# Patient Record
Sex: FEMALE | Race: WHITE | NOT HISPANIC OR LATINO | URBAN - METROPOLITAN AREA
[De-identification: names, ages, dates, MRNs, and addresses within clinical notes are randomized per-mention and may not be internally consistent; named-entity substitution may affect disease eponyms.]

---

## 2020-10-21 ENCOUNTER — APPOINTMENT (RX ONLY)
Dept: URBAN - METROPOLITAN AREA CLINIC 167 | Facility: CLINIC | Age: 30
Setting detail: DERMATOLOGY
End: 2020-10-21

## 2020-10-21 DIAGNOSIS — L21.8 OTHER SEBORRHEIC DERMATITIS: ICD-10-CM

## 2020-10-21 DIAGNOSIS — L57.8 OTHER SKIN CHANGES DUE TO CHRONIC EXPOSURE TO NONIONIZING RADIATION: ICD-10-CM

## 2020-10-21 DIAGNOSIS — I78.8 OTHER DISEASES OF CAPILLARIES: ICD-10-CM

## 2020-10-21 DIAGNOSIS — Z71.89 OTHER SPECIFIED COUNSELING: ICD-10-CM

## 2020-10-21 DIAGNOSIS — D22 MELANOCYTIC NEVI: ICD-10-CM

## 2020-10-21 PROBLEM — D22.61 MELANOCYTIC NEVI OF RIGHT UPPER LIMB, INCLUDING SHOULDER: Status: ACTIVE | Noted: 2020-10-21

## 2020-10-21 PROBLEM — D22.62 MELANOCYTIC NEVI OF LEFT UPPER LIMB, INCLUDING SHOULDER: Status: ACTIVE | Noted: 2020-10-21

## 2020-10-21 PROBLEM — D48.5 NEOPLASM OF UNCERTAIN BEHAVIOR OF SKIN: Status: ACTIVE | Noted: 2020-10-21

## 2020-10-21 PROBLEM — D22.5 MELANOCYTIC NEVI OF TRUNK: Status: ACTIVE | Noted: 2020-10-21

## 2020-10-21 PROCEDURE — 99203 OFFICE O/P NEW LOW 30 MIN: CPT | Mod: 25

## 2020-10-21 PROCEDURE — ? COUNSELING

## 2020-10-21 PROCEDURE — ? BIOPSY BY SHAVE METHOD

## 2020-10-21 PROCEDURE — 11102 TANGNTL BX SKIN SINGLE LES: CPT

## 2020-10-21 PROCEDURE — ? OTHER

## 2020-10-21 PROCEDURE — ? PRESCRIPTION

## 2020-10-21 PROCEDURE — ? TREATMENT REGIMEN

## 2020-10-21 PROCEDURE — 11103 TANGNTL BX SKIN EA SEP/ADDL: CPT

## 2020-10-21 PROCEDURE — ? EDUCATIONAL RESOURCES PROVIDED

## 2020-10-21 RX ORDER — CLOBETASOL PROPIONATE 0.5 MG/ML
SOLUTION TOPICAL
Qty: 1 | Refills: 5 | Status: ERX | COMMUNITY
Start: 2020-10-21

## 2020-10-21 RX ADMIN — CLOBETASOL PROPIONATE 1: 0.5 SOLUTION TOPICAL at 00:00

## 2020-10-21 ASSESSMENT — LOCATION DETAILED DESCRIPTION DERM
LOCATION DETAILED: RIGHT SUPERIOR MEDIAL UPPER BACK
LOCATION DETAILED: LEFT LATERAL DISTAL UPPER ARM
LOCATION DETAILED: LEFT RIB CAGE
LOCATION DETAILED: LEFT ANTERIOR PROXIMAL UPPER ARM
LOCATION DETAILED: LEFT INFERIOR CENTRAL MALAR CHEEK
LOCATION DETAILED: LEFT CENTRAL PARIETAL SCALP
LOCATION DETAILED: LEFT CLAVICULAR NECK
LOCATION DETAILED: RIGHT INFERIOR CENTRAL MALAR CHEEK
LOCATION DETAILED: RIGHT CENTRAL MALAR CHEEK
LOCATION DETAILED: RIGHT ANTERIOR PROXIMAL UPPER ARM

## 2020-10-21 ASSESSMENT — LOCATION SIMPLE DESCRIPTION DERM
LOCATION SIMPLE: RIGHT UPPER BACK
LOCATION SIMPLE: SCALP
LOCATION SIMPLE: LEFT UPPER ARM
LOCATION SIMPLE: ABDOMEN
LOCATION SIMPLE: LEFT CHEEK
LOCATION SIMPLE: RIGHT CHEEK
LOCATION SIMPLE: LEFT ANTERIOR NECK
LOCATION SIMPLE: RIGHT UPPER ARM

## 2020-10-21 ASSESSMENT — LOCATION ZONE DERM
LOCATION ZONE: FACE
LOCATION ZONE: NECK
LOCATION ZONE: ARM
LOCATION ZONE: TRUNK
LOCATION ZONE: SCALP

## 2020-10-21 NOTE — HPI: SKIN LESION
What Type Of Note Output Would You Prefer (Optional)?: Bullet Format
How Severe Is Your Skin Lesion?: mild
Has Your Skin Lesion Been Treated?: been treated
Is This A New Presentation, Or A Follow-Up?: Mole
When Was It Treated?: 2010

## 2020-10-21 NOTE — PROCEDURE: OTHER
Note Text (......Xxx Chief Complaint.): This diagnosis correlates with the
Other (Free Text): Raised spots on bilateral cheeks x 2 years\\n\\nDiscussed laser treatment-patient declined
Detail Level: Detailed
Other (Free Text): Itchy mole, changing in shape on left arm
Other (Free Text): Itchy mole on left neck x many years
Other (Free Text): Itchy patch on scalp x 10 months\\nUsing triamcinolone cream every other day since May or June 2020\\nmakes her hair greasy\\nhelps, but not going away\\nwas told to use a dandruff shampoo, head and shoulders, but it did not help
Other (Free Text): Reoccurring mole under left breast (was removed 10 years ago)\\nbx was benign\\nraised, but not as big as it was\\nis a recurrent nevus
Other (Free Text): Has questions regarding skin care products\\nusing lotion BID only\\n \\nWears SPF 50 outside, only when out a long time\\nWears SPF 30 on other days\\n\\nrecc Differin (adapalene)\\nrecc Alto serum - antioxidant\\nrecc Elta MD or La Roche Posay

## 2020-10-21 NOTE — PROCEDURE: BIOPSY BY SHAVE METHOD
Detail Level: Detailed
Depth Of Biopsy: dermis
Was A Bandage Applied: Yes
Size Of Lesion In Cm: 0.2
X Size Of Lesion In Cm: 0
Biopsy Type: H and E
Biopsy Method: Personna blade
Anesthesia Type: 1% lidocaine with epinephrine
Anesthesia Volume In Cc (Will Not Render If 0): 0.5
Hemostasis: Drysol
Wound Care: Aquaphor
Dressing: bandage
Destruction After The Procedure: No
Type Of Destruction Used: Curettage
Curettage Text: The wound bed was treated with curettage after the biopsy was performed.
Cryotherapy Text: The wound bed was treated with cryotherapy after the biopsy was performed.
Electrodesiccation Text: The wound bed was treated with electrodesiccation after the biopsy was performed.
Electrodesiccation And Curettage Text: The wound bed was treated with electrodesiccation and curettage after the biopsy was performed.
Silver Nitrate Text: The wound bed was treated with silver nitrate after the biopsy was performed.
Lab: [id number]
Lab Facility: [id number]
Consent: Written consent was obtained and risks were reviewed including but not limited to scarring, infection, bleeding, scabbing, incomplete removal, nerve damage and allergy to anesthesia.
Post-Care Instructions: I reviewed with the patient in detail post-care instructions. Patient is to keep the area dry and covered with the bandage for about 24 hours.  Once the bandage is removed, the patient should wash the site with soap and water, apply Aquaphor or Vasiline and re-cover the site with a bandage once to twice daily until healed.  Keeping a thin layer of ointment on the wound (rather than letting a scab form) helps promote healing. Antibiotic ointments (Neosporin, Polysporin, Bacitracin, Triple Antibiotic) are not needed nor recommended, but may be used if they have been used more than twice in the past without developing an allergic reaction to them. Should the patient develop any fevers, chills, bleeding, severe pain, increased redness/drainage/crust/pus, patient will contact the office immediately.
Notification Instructions: Patient will be notified of biopsy results. However, patient instructed to call the office if not contacted within 2 weeks.
Billing Type: Third-Party Bill
Information: Selecting Yes will display possible errors in your note based on the variables you have selected. This validation is only offered as a suggestion for you. PLEASE NOTE THAT THE VALIDATION TEXT WILL BE REMOVED WHEN YOU FINALIZE YOUR NOTE. IF YOU WANT TO FAX A PRELIMINARY NOTE YOU WILL NEED TO TOGGLE THIS TO 'NO' IF YOU DO NOT WANT IT IN YOUR FAXED NOTE.
Size Of Lesion In Cm: 0.3
Lab: [id number]
Lab Facility: [id number]
Post-Care Instructions: I reviewed with the patient in detail post-care instructions. Patient is to keep the area dry and covered with the bandage for about 24 hours.  Once the bandage is removed, the patient should wash the site with soap and water, apply Aquaphor or Vasiline and re-cover the site with a bandage once to twice daily until healed.  Keeping a thin layer of ointment on the wound (rather than letting a scab form) helps promote healing. Antibiotic ointments (Neosporin, Polysporin, Bacitracin, Triple Antibiotic) are not needed nor recommended, but may be used if they have been used more than twice in the past without developing an allergic reaction to them. Should the patient develop any fevers, chills, bleeding, severe pain, increased redness/drainage/crust/pus, patient will contact the office immediately.
Billing Type: Third-Party Bill

## 2020-10-21 NOTE — PROCEDURE: TREATMENT REGIMEN
Otc Regimen: Recommended:\\nElta MD sunscreen and/or LaRoche Pose sunscreen\\nTopical Antioxidants (skin better alto serum)\\nTopical retinoid (Differin)
Detail Level: Zone
Action 4: Continue
Start Regimen: Clobetasol solution twice daily

## 2021-10-19 ENCOUNTER — APPOINTMENT (RX ONLY)
Dept: URBAN - METROPOLITAN AREA CLINIC 166 | Facility: CLINIC | Age: 31
Setting detail: DERMATOLOGY
End: 2021-10-19

## 2021-10-19 DIAGNOSIS — L90.5 SCAR CONDITIONS AND FIBROSIS OF SKIN: ICD-10-CM

## 2021-10-19 DIAGNOSIS — L71.0 PERIORAL DERMATITIS: ICD-10-CM

## 2021-10-19 DIAGNOSIS — L21.8 OTHER SEBORRHEIC DERMATITIS: ICD-10-CM

## 2021-10-19 PROCEDURE — 99214 OFFICE O/P EST MOD 30 MIN: CPT

## 2021-10-19 PROCEDURE — ? PRESCRIPTION

## 2021-10-19 PROCEDURE — ? PHOTO-DOCUMENTATION

## 2021-10-19 PROCEDURE — ? OTHER

## 2021-10-19 PROCEDURE — ? COUNSELING

## 2021-10-19 PROCEDURE — ? TREATMENT REGIMEN

## 2021-10-19 RX ORDER — PIMECROLIMUS 10 MG/G
CREAM TOPICAL
Qty: 60 | Refills: 3 | Status: ERX | COMMUNITY
Start: 2021-10-19

## 2021-10-19 RX ORDER — MINOCYCLINE 15 MG/G
AEROSOL, FOAM TOPICAL
Qty: 30 | Refills: 5 | Status: ERX | COMMUNITY
Start: 2021-10-19

## 2021-10-19 RX ORDER — IVERMECTIN 10 MG/G
CREAM TOPICAL
Qty: 45 | Refills: 5 | Status: ERX | COMMUNITY
Start: 2021-10-19

## 2021-10-19 RX ADMIN — IVERMECTIN: 10 CREAM TOPICAL at 00:00

## 2021-10-19 RX ADMIN — MINOCYCLINE: 15 AEROSOL, FOAM TOPICAL at 00:00

## 2021-10-19 RX ADMIN — PIMECROLIMUS 1: 10 CREAM TOPICAL at 00:00

## 2021-10-19 ASSESSMENT — LOCATION DETAILED DESCRIPTION DERM
LOCATION DETAILED: LEFT INFERIOR FRONTAL SCALP
LOCATION DETAILED: LEFT UPPER CUTANEOUS LIP
LOCATION DETAILED: RIGHT UPPER CUTANEOUS LIP
LOCATION DETAILED: LEFT LATERAL DISTAL UPPER ARM

## 2021-10-19 ASSESSMENT — LOCATION SIMPLE DESCRIPTION DERM
LOCATION SIMPLE: RIGHT LIP
LOCATION SIMPLE: SCALP
LOCATION SIMPLE: LEFT LIP
LOCATION SIMPLE: LEFT UPPER ARM

## 2021-10-19 ASSESSMENT — LOCATION ZONE DERM
LOCATION ZONE: LIP
LOCATION ZONE: SCALP
LOCATION ZONE: ARM

## 2021-10-19 NOTE — PROCEDURE: TREATMENT REGIMEN
Detail Level: Zone
Samples Given: Neutrogena Tsal and Tgel Therapeutic Shampoo
Otc Regimen: Neutrogena Tsal and Tgel Therapeutic Shampoo twice a week as needed.
Otc Regimen: Differen every night
Samples Given: Soolantra cream\\nZilxi Foam\\n.

## 2021-10-19 NOTE — PROCEDURE: OTHER
Detail Level: Detailed
Other (Free Text): 10/21/20\\nItchy patch on scalp x 10 months\\nUsing triamcinolone cream every other day since May or June 2020\\nmakes her hair greasy\\nhelps, but not going away\\nwas told to use a dandruff shampoo, head and shoulders, but it did not help\\n\\n10/19/21\\nstill using clobetasol solution \\nuses it for about a week when flared up \\nthen stops using it \\nWhen she stops, it flares again about a week later\\nTried different shampoos (normal shampoo, head and shoulders, and another shampoo)\\n\\nOn scalp above left ear\\nSleeps on left side\\nIs just thin adherent scale right now, no inflammation, no crust/scabs - chronic, not stable\\n\\nTry T-sal or T-gel to decrease scale\\nwill refill clobetasol
Note Text (......Xxx Chief Complaint.): This diagnosis correlates with the
Render Risk Assessment In Note?: no
Other (Free Text): Rash under nose\\n**rash, itchy,red,was painful and flaky \\n**started at the end of July through beginning of September (has pictures) - there are red scaly plaques around the nose and a few red papules along the NLF\\nStarted using TAC 0.1% cr at the beginning of September \\nwas using it twice a day for 1 week, then cleared up\\nBut it recurred\\nUses TAC prn\\nDoesn’t go away completely\\n\\nstarted using new tinted moisturizer in June (still using it)\\nStarted using a nasal spray before the rash\\nHasn’t used it for month\\n\\nJust developed new red bumps on the L chin\\nStop TAC - may cause a rebound flare \\nStart Elidel BID\\nTry samples of Zilxi and Soolantra\\nCan fill the one(s) that she likes - Rx's sent\\nHandout of POD\\nRefer to [email or web address] \"diet and rosacea\"
Render Risk Assessment In Note?: yes
Other (Free Text): biopsy on L upper arm 10/21/20\\nLentigonous Compound Melanocytic Nevus\\n**flat brown scar\\nwants to know if there’s any tx for it\\nIs hyperpigmented\\ngets darker in the sun\\nwears sunscreen\\nTried OTC Mederma - created a white spot around it\\n\\nTry OTC Differin Qhs\\nSun protection\\nCan consider Lytera 2.0\\nProsil/silicone probably won't help\\n

## 2022-01-13 ENCOUNTER — APPOINTMENT (RX ONLY)
Dept: URBAN - METROPOLITAN AREA CLINIC 166 | Facility: CLINIC | Age: 32
Setting detail: DERMATOLOGY
End: 2022-01-13

## 2022-01-13 DIAGNOSIS — Z71.89 OTHER SPECIFIED COUNSELING: ICD-10-CM

## 2022-01-13 DIAGNOSIS — D22 MELANOCYTIC NEVI: ICD-10-CM

## 2022-01-13 DIAGNOSIS — L21.8 OTHER SEBORRHEIC DERMATITIS: ICD-10-CM

## 2022-01-13 DIAGNOSIS — L71.0 PERIORAL DERMATITIS: ICD-10-CM

## 2022-01-13 DIAGNOSIS — L81.4 OTHER MELANIN HYPERPIGMENTATION: ICD-10-CM

## 2022-01-13 PROBLEM — D22.71 MELANOCYTIC NEVI OF RIGHT LOWER LIMB, INCLUDING HIP: Status: ACTIVE | Noted: 2022-01-13

## 2022-01-13 PROBLEM — D22.5 MELANOCYTIC NEVI OF TRUNK: Status: ACTIVE | Noted: 2022-01-13

## 2022-01-13 PROBLEM — D22.61 MELANOCYTIC NEVI OF RIGHT UPPER LIMB, INCLUDING SHOULDER: Status: ACTIVE | Noted: 2022-01-13

## 2022-01-13 PROBLEM — D22.62 MELANOCYTIC NEVI OF LEFT UPPER LIMB, INCLUDING SHOULDER: Status: ACTIVE | Noted: 2022-01-13

## 2022-01-13 PROBLEM — D22.72 MELANOCYTIC NEVI OF LEFT LOWER LIMB, INCLUDING HIP: Status: ACTIVE | Noted: 2022-01-13

## 2022-01-13 PROCEDURE — ? PRESCRIPTION

## 2022-01-13 PROCEDURE — ? TREATMENT REGIMEN

## 2022-01-13 PROCEDURE — 99214 OFFICE O/P EST MOD 30 MIN: CPT

## 2022-01-13 PROCEDURE — ? PHOTO-DOCUMENTATION

## 2022-01-13 PROCEDURE — ? COUNSELING

## 2022-01-13 PROCEDURE — ? OTHER

## 2022-01-13 RX ORDER — CLOBETASOL PROPIONATE 0.5 MG/ML
SOLUTION TOPICAL
Qty: 50 | Refills: 3 | Status: ERX

## 2022-01-13 RX ORDER — AZELAIC ACID 0.15 G/G
GEL TOPICAL
Qty: 50 | Refills: 6 | Status: ERX | COMMUNITY
Start: 2022-01-13

## 2022-01-13 RX ADMIN — AZELAIC ACID 1: 0.15 GEL TOPICAL at 00:00

## 2022-01-13 ASSESSMENT — LOCATION SIMPLE DESCRIPTION DERM
LOCATION SIMPLE: RIGHT POSTERIOR UPPER ARM
LOCATION SIMPLE: RIGHT LIP
LOCATION SIMPLE: LEFT POSTERIOR THIGH
LOCATION SIMPLE: RIGHT POSTERIOR THIGH
LOCATION SIMPLE: UPPER BACK
LOCATION SIMPLE: LEFT POSTERIOR UPPER ARM
LOCATION SIMPLE: LEFT UPPER BACK
LOCATION SIMPLE: SCALP
LOCATION SIMPLE: LEFT LIP

## 2022-01-13 ASSESSMENT — LOCATION DETAILED DESCRIPTION DERM
LOCATION DETAILED: RIGHT PROXIMAL POSTERIOR UPPER ARM
LOCATION DETAILED: LEFT SUPERIOR MEDIAL UPPER BACK
LOCATION DETAILED: RIGHT UPPER CUTANEOUS LIP
LOCATION DETAILED: LEFT INFERIOR FRONTAL SCALP
LOCATION DETAILED: LEFT DISTAL POSTERIOR UPPER ARM
LOCATION DETAILED: LEFT PROXIMAL POSTERIOR UPPER ARM
LOCATION DETAILED: INFERIOR THORACIC SPINE
LOCATION DETAILED: RIGHT DISTAL POSTERIOR THIGH
LOCATION DETAILED: LEFT UPPER CUTANEOUS LIP
LOCATION DETAILED: LEFT DISTAL POSTERIOR THIGH

## 2022-01-13 ASSESSMENT — LOCATION ZONE DERM
LOCATION ZONE: TRUNK
LOCATION ZONE: LIP
LOCATION ZONE: ARM
LOCATION ZONE: LEG
LOCATION ZONE: SCALP

## 2022-01-13 NOTE — PROCEDURE: OTHER
Detail Level: Detailed
Note Text (......Xxx Chief Complaint.): This diagnosis correlates with the
Render Risk Assessment In Note?: no
Other (Free Text): 10/19/21 Elidel Cr BID - $7 keeps it under control, but won't clear the bumps (only TAC does)\\n10/19/21 Soolantra Cr Qhs - did not help, made her get red bumps\\n10/19/21 Zilxi foam QD - Did not help, was $50\\n1/13/22 Finacea BID\\n\\nLast seen 10/19/21\\n\\n-----------\\n\\n10/19/21\\nRash under nose\\n**rash, itchy,red,was painful and flaky \\n**started at the end of July through beginning of September (has pictures) - there are red scaly plaques around the nose and a few red papules along the NLF\\nStarted using TAC 0.1% cr at the beginning of September \\nwas using it twice a day for 1 week, then cleared up\\nBut it recurred\\nUses TAC prn\\nDoesn’t go away completely\\n\\nstarted using new tinted moisturizer in June (still using it)\\nStarted using a nasal spray before the rash\\nHasn’t used it for month\\n\\nJust developed new red bumps on the L chin\\nStop TAC - may cause a rebound flare \\nStart Elidel BID\\nTry samples of Zilxi and Soolantra\\nCan fill the one(s) that she likes - Rx's sent\\nHandout of POD\\nRefer to [email or web address] \"diet and rosacea\"\\n\\n1/13/22\\nUsed Zilxi samples and Soolantra first\\nBoth did not seem to help\\nZilxi was $50 - did not get it\\nSoolantra $7 made her really bumpy, did not help\\n\\nThen filled the Elidel $7\\nUses it for maintenance\\nDoesn't help with the bumps, though\\n\\nTAC works well\\nuses it only for a day or so for the bumps, then it goes away\\nI don't recommend it for POD or on the face\\n\\nGets a flare about every 2 weeks\\nWould prefer to avoid oral Oracea if possible\\n\\nAdd Finacea BID
Other (Free Text): 10/21/20\\nItchy patch on scalp x 10 months\\nUsing triamcinolone cream every other day since May or June 2020\\nmakes her hair greasy\\nhelps, but not going away\\nwas told to use a dandruff shampoo, head and shoulders, but it did not help\\n\\n10/19/21\\nstill using clobetasol solution \\nuses it for about a week when flared up \\nthen stops using it \\nWhen she stops, it flares again about a week later\\nTried different shampoos (normal shampoo, head and shoulders, and another shampoo)\\n\\nOn scalp above left ear\\nSleeps on left side\\nIs just thin adherent scale right now, no inflammation, no crust/scabs - chronic, not stable\\n\\nTry T-sal or T-gel to decrease scale\\nwill refill clobetasol\\n\\n1/13/22\\n**T-sal shampoo has been working\\nshe is not itchy anymore\\n**clobetasol solution - used it a few times \\n- only used it twice since 10/19/21\\n- will send a refill (last Rx'd 10/21/20)
Render Risk Assessment In Note?: yes

## 2022-01-13 NOTE — PROCEDURE: TREATMENT REGIMEN
Detail Level: Zone
Continue Regimen: clobetasol 0.05 % scalp solution: Apply to the affected area on scalp twice daily as needed for itch or rash for up to two weeks per month. Do not use on face.
Otc Regimen: Continue Neutrogena Tsal Shampoo twice a week as needed.

## 2022-05-05 ENCOUNTER — APPOINTMENT (RX ONLY)
Dept: URBAN - METROPOLITAN AREA CLINIC 166 | Facility: CLINIC | Age: 32
Setting detail: DERMATOLOGY
End: 2022-05-05

## 2022-05-05 DIAGNOSIS — L71.0 PERIORAL DERMATITIS: ICD-10-CM

## 2022-05-05 PROCEDURE — ? COUNSELING

## 2022-05-05 PROCEDURE — 99213 OFFICE O/P EST LOW 20 MIN: CPT

## 2022-05-05 PROCEDURE — ? TREATMENT REGIMEN

## 2022-05-05 PROCEDURE — ? PHOTO-DOCUMENTATION

## 2022-05-05 PROCEDURE — ? OTHER

## 2022-05-05 ASSESSMENT — LOCATION DETAILED DESCRIPTION DERM
LOCATION DETAILED: RIGHT UPPER CUTANEOUS LIP
LOCATION DETAILED: LEFT UPPER CUTANEOUS LIP

## 2022-05-05 ASSESSMENT — LOCATION ZONE DERM: LOCATION ZONE: LIP

## 2022-05-05 ASSESSMENT — LOCATION SIMPLE DESCRIPTION DERM
LOCATION SIMPLE: LEFT LIP
LOCATION SIMPLE: RIGHT LIP

## 2022-05-05 NOTE — PROCEDURE: OTHER
Detail Level: Detailed
Note Text (......Xxx Chief Complaint.): This diagnosis correlates with the
Render Risk Assessment In Note?: no
Other (Free Text): 10/19/21 Elidel Cr BID - $7 keeps it under control, but won't clear the bumps (only TAC does)\\n10/19/21 Soolantra Cr Qhs - did not help, made her get red bumps\\n10/19/21 Zilxi foam QD - Did not help, was $50\\n1/13/22 Finacea BID\\n\\nLast seen 1/13/22\\n\\n-----------\\n\\n10/19/21\\nRash under nose\\n**rash, itchy,red,was painful and flaky \\n**started at the end of July through beginning of September (has pictures) - there are red scaly plaques around the nose and a few red papules along the NLF\\nStarted using TAC 0.1% cr at the beginning of September \\nwas using it twice a day for 1 week, then cleared up\\nBut it recurred\\nUses TAC prn\\nDoesn’t go away completely\\n\\nstarted using new tinted moisturizer in June (still using it)\\nStarted using a nasal spray before the rash\\nHasn’t used it for month\\n\\nJust developed new red bumps on the L chin\\nStop TAC - may cause a rebound flare \\nStart Elidel BID\\nTry samples of Zilxi and Soolantra\\nCan fill the one(s) that she likes - Rx's sent\\nHandout of POD\\nRefer to [email or web address] \"diet and rosacea\"\\n\\n1/13/22\\nUsed Zilxi samples and Soolantra first\\nBoth did not seem to help\\nZilxi was $50 - did not get it\\nSoolantra $7 made her really bumpy, did not help\\n\\nThen filled the Elidel $7\\nUses it for maintenance\\nDoesn't help with the bumps, though\\n\\nTAC works well\\nuses it only for a day or so for the bumps, then it goes away\\nI don't recommend it for POD or on the face\\n\\nGets a flare about every 2 weeks\\nWould prefer to avoid oral Oracea if possible\\n\\nAdd Finacea BID\\n\\n5/5/22\\n\\nWas hard to clear in the beginning\\nWould almost always have bumps\\nWould go away for only 12 hours or so\\n\\nHas makeup on today\\nHas been clear for 1-2 weeks\\nHas been eating a lot of spicy foods\\nA couple of drinks a week\\nGot a new face lotion/sunscreen with zinc x a couple of months when outside a lot\\nGot a new setting spray\\n\\nHad really itchy flaky skin around her nose\\nNever recurred with Elidel\\nBumps are sometimes a little itchy, but not uncomfortable\\n\\nUsing mostly Elidel prn when flared\\nUsing Elidel the most because she is almost always flared\\n\\nUsed Finacea a few times\\n- one time it caused a whiteness of the skin temporarily\\n- not sure why \\nOnly using Finacea when clear\\nshe should also use it when flared, at the same time as Elidel

## 2022-07-09 ENCOUNTER — TELEPHONE ENCOUNTER (OUTPATIENT)
Dept: URBAN - METROPOLITAN AREA CLINIC 121 | Facility: CLINIC | Age: 32
End: 2022-07-09

## 2022-07-10 ENCOUNTER — TELEPHONE ENCOUNTER (OUTPATIENT)
Dept: URBAN - METROPOLITAN AREA CLINIC 121 | Facility: CLINIC | Age: 32
End: 2022-07-10

## 2022-07-10 RX ORDER — DEXTROAMPHETAMINE SULFATE 10 MG/1
CAPSULE, EXTENDED RELEASE ORAL ONCE A DAY
Refills: 0 | Status: ACTIVE | COMMUNITY
Start: 2017-09-14

## 2022-07-10 RX ORDER — NORETHINDRONE ACETATE AND ETHINYL ESTRADIOL 1.5-30(21)
KIT ORAL ONCE A DAY
Refills: 0 | Status: ACTIVE | COMMUNITY
Start: 2017-09-14

## 2022-07-10 RX ORDER — ZOLMITRIPTAN 5 MG/1
TABLET, FILM COATED ORAL AS NEEDED
Refills: 0 | Status: ACTIVE | COMMUNITY
Start: 2017-09-14

## 2023-05-12 ENCOUNTER — APPOINTMENT (RX ONLY)
Dept: URBAN - METROPOLITAN AREA CLINIC 167 | Facility: CLINIC | Age: 33
Setting detail: DERMATOLOGY
End: 2023-05-12

## 2023-05-12 DIAGNOSIS — D18.0 HEMANGIOMA: ICD-10-CM

## 2023-05-12 DIAGNOSIS — L71.0 PERIORAL DERMATITIS: ICD-10-CM

## 2023-05-12 DIAGNOSIS — D22 MELANOCYTIC NEVI: ICD-10-CM

## 2023-05-12 DIAGNOSIS — L21.8 OTHER SEBORRHEIC DERMATITIS: ICD-10-CM

## 2023-05-12 DIAGNOSIS — Z71.89 OTHER SPECIFIED COUNSELING: ICD-10-CM

## 2023-05-12 PROBLEM — D22.72 MELANOCYTIC NEVI OF LEFT LOWER LIMB, INCLUDING HIP: Status: ACTIVE | Noted: 2023-05-12

## 2023-05-12 PROBLEM — D22.61 MELANOCYTIC NEVI OF RIGHT UPPER LIMB, INCLUDING SHOULDER: Status: ACTIVE | Noted: 2023-05-12

## 2023-05-12 PROBLEM — D22.71 MELANOCYTIC NEVI OF RIGHT LOWER LIMB, INCLUDING HIP: Status: ACTIVE | Noted: 2023-05-12

## 2023-05-12 PROBLEM — D22.5 MELANOCYTIC NEVI OF TRUNK: Status: ACTIVE | Noted: 2023-05-12

## 2023-05-12 PROBLEM — D18.01 HEMANGIOMA OF SKIN AND SUBCUTANEOUS TISSUE: Status: ACTIVE | Noted: 2023-05-12

## 2023-05-12 PROBLEM — D22.62 MELANOCYTIC NEVI OF LEFT UPPER LIMB, INCLUDING SHOULDER: Status: ACTIVE | Noted: 2023-05-12

## 2023-05-12 PROCEDURE — ? PHOTO-DOCUMENTATION

## 2023-05-12 PROCEDURE — ? OTHER

## 2023-05-12 PROCEDURE — ? PRESCRIPTION

## 2023-05-12 PROCEDURE — ? BENIGN DESTRUCTION COSMETIC

## 2023-05-12 PROCEDURE — ? TREATMENT REGIMEN

## 2023-05-12 PROCEDURE — 99214 OFFICE O/P EST MOD 30 MIN: CPT

## 2023-05-12 PROCEDURE — ? COUNSELING

## 2023-05-12 RX ORDER — PIMECROLIMUS 10 MG/G
1 CREAM TOPICAL BID
Qty: 60 | Refills: 11 | Status: ERX

## 2023-05-12 RX ORDER — CLOBETASOL PROPIONATE 0.5 MG/ML
1 SOLUTION TOPICAL BID
Qty: 50 | Refills: 11 | Status: ERX

## 2023-05-12 RX ORDER — AZELAIC ACID 0.15 G/G
1 GEL TOPICAL BID
Qty: 50 | Refills: 11 | Status: ERX

## 2023-05-12 ASSESSMENT — LOCATION SIMPLE DESCRIPTION DERM
LOCATION SIMPLE: CHEST
LOCATION SIMPLE: RIGHT LIP
LOCATION SIMPLE: RIGHT UPPER BACK
LOCATION SIMPLE: LEFT FOREARM
LOCATION SIMPLE: SCALP
LOCATION SIMPLE: LEFT LIP
LOCATION SIMPLE: RIGHT FOREARM
LOCATION SIMPLE: RIGHT THIGH
LOCATION SIMPLE: LEFT THIGH

## 2023-05-12 ASSESSMENT — LOCATION DETAILED DESCRIPTION DERM
LOCATION DETAILED: LEFT DISTAL DORSAL FOREARM
LOCATION DETAILED: LEFT INFERIOR FRONTAL SCALP
LOCATION DETAILED: RIGHT UPPER CUTANEOUS LIP
LOCATION DETAILED: RIGHT INFERIOR MEDIAL UPPER BACK
LOCATION DETAILED: RIGHT SUPERIOR MEDIAL UPPER BACK
LOCATION DETAILED: LEFT UPPER CUTANEOUS LIP
LOCATION DETAILED: RIGHT DISTAL DORSAL FOREARM
LOCATION DETAILED: UPPER STERNUM
LOCATION DETAILED: LOWER STERNUM
LOCATION DETAILED: RIGHT ANTERIOR DISTAL THIGH
LOCATION DETAILED: LEFT ANTERIOR DISTAL THIGH

## 2023-05-12 ASSESSMENT — LOCATION ZONE DERM
LOCATION ZONE: LEG
LOCATION ZONE: TRUNK
LOCATION ZONE: LIP
LOCATION ZONE: SCALP
LOCATION ZONE: ARM

## 2023-05-12 NOTE — PROCEDURE: BENIGN DESTRUCTION COSMETIC
Post-Care Instructions: I reviewed with the patient in detail post-care instructions. Patient is to wear sunprotection, and avoid picking at any of the treated lesions. Pt may apply Vaseline to crusted or scabbing areas.
Detail Level: Zone
Consent: The patient's consent was obtained including but not limited to risks of crusting, scabbing, blistering, scarring, darker or lighter pigmentary change, recurrence, incomplete removal and infection.
Price (Use Numbers Only, No Special Characters Or $): 60
Anesthesia Volume In Cc: 0
Anesthesia Type: 0.05% lidocaine without epinephrine

## 2023-05-12 NOTE — PROCEDURE: OTHER
Detail Level: Detailed
Note Text (......Xxx Chief Complaint.): This diagnosis correlates with the
Render Risk Assessment In Note?: no
Other (Free Text): 10/19/21 Elidel Cr BID - $7 keeps it under control, but didn't clear the bumps\\n10/19/21 Soolantra Cr Qhs - did not help, made her get red bumps\\n10/19/21 Zilxi foam QD - Did not help, was $50\\n1/13/22 Finacea BID - helps \\n\\nLast seen 5/5/22\\n\\n-----------\\n\\nPOD was hard to clear in the beginning\\nShe used to almost always have bumps\\nthey would go away for only 12 hours or so\\nNow has been clear for 1-2 weeks\\nHas makeup on today\\nwell controlled\\n\\nHas been eating a lot of spicy foods\\nA couple of drinks a week\\nGot a new face lotion/sunscreen with zinc x a couple of months when outside a lot\\nGot a new setting spray\\n\\nuses elidel as needed, mostly for flares\\nHas hardly needed it\\nhelps\\n\\nuses Finacea 15% gel QD\\nno side effects\\nhelps with tx and prevention
Other (Free Text): no recent flares, not itchy \\nUsing T-Sal shampoo - helps the scaly areas - con't for prevention and treatment\\nUsing Clobetasol 0.05% solution daily for prevention \\nResponds within a couple of days\\nIf she stops, it starts itching again within a couple of weeks\\n- should just use prn
Render Risk Assessment In Note?: yes